# Patient Record
Sex: FEMALE | Race: OTHER | HISPANIC OR LATINO | URBAN - METROPOLITAN AREA
[De-identification: names, ages, dates, MRNs, and addresses within clinical notes are randomized per-mention and may not be internally consistent; named-entity substitution may affect disease eponyms.]

---

## 2018-03-08 ENCOUNTER — EMERGENCY (EMERGENCY)
Facility: HOSPITAL | Age: 58
LOS: 1 days | Discharge: HOME | End: 2018-03-08
Attending: EMERGENCY MEDICINE | Admitting: EMERGENCY MEDICINE
Payer: COMMERCIAL

## 2018-03-08 VITALS
WEIGHT: 160.06 LBS | DIASTOLIC BLOOD PRESSURE: 86 MMHG | TEMPERATURE: 98 F | SYSTOLIC BLOOD PRESSURE: 148 MMHG | HEART RATE: 89 BPM | RESPIRATION RATE: 18 BRPM | OXYGEN SATURATION: 97 %

## 2018-03-08 PROBLEM — Z00.00 ENCOUNTER FOR PREVENTIVE HEALTH EXAMINATION: Status: ACTIVE | Noted: 2018-03-08

## 2018-03-08 LAB
APPEARANCE UR: CLEAR — SIGNIFICANT CHANGE UP
BACTERIA # UR AUTO: ABNORMAL /HPF
BILIRUB UR-MCNC: NEGATIVE — SIGNIFICANT CHANGE UP
COLOR SPEC: YELLOW — SIGNIFICANT CHANGE UP
DIFF PNL FLD: NEGATIVE — SIGNIFICANT CHANGE UP
EPI CELLS # UR: SIGNIFICANT CHANGE UP /HPF
GLUCOSE UR QL: NEGATIVE — SIGNIFICANT CHANGE UP
HYALINE CASTS # UR AUTO: ABNORMAL
KETONES UR-MCNC: NEGATIVE — SIGNIFICANT CHANGE UP
LEUKOCYTE ESTERASE UR-ACNC: ABNORMAL
NITRITE UR-MCNC: NEGATIVE — SIGNIFICANT CHANGE UP
PH UR: 7.5 — SIGNIFICANT CHANGE UP (ref 5–8)
PROT UR-MCNC: SIGNIFICANT CHANGE UP
RBC CASTS # UR COMP ASSIST: SIGNIFICANT CHANGE UP /HPF (ref 0–2)
SP GR SPEC: 1.01 — SIGNIFICANT CHANGE UP (ref 1.01–1.02)
UROBILINOGEN FLD QL: NEGATIVE — SIGNIFICANT CHANGE UP
WBC UR QL: SIGNIFICANT CHANGE UP /HPF (ref 0–5)

## 2018-03-08 PROCEDURE — 99283 EMERGENCY DEPT VISIT LOW MDM: CPT

## 2018-03-08 PROCEDURE — 81001 URINALYSIS AUTO W/SCOPE: CPT

## 2018-03-08 RX ORDER — DIAZEPAM 5 MG
5 TABLET ORAL ONCE
Qty: 0 | Refills: 0 | Status: DISCONTINUED | OUTPATIENT
Start: 2018-03-08 | End: 2018-03-08

## 2018-03-08 RX ORDER — IBUPROFEN 200 MG
1 TABLET ORAL
Qty: 20 | Refills: 0 | OUTPATIENT
Start: 2018-03-08 | End: 2018-03-12

## 2018-03-08 RX ORDER — DIAZEPAM 5 MG
1 TABLET ORAL
Qty: 9 | Refills: 0 | OUTPATIENT
Start: 2018-03-08 | End: 2018-03-10

## 2018-03-08 RX ORDER — IBUPROFEN 200 MG
600 TABLET ORAL ONCE
Qty: 0 | Refills: 0 | Status: COMPLETED | OUTPATIENT
Start: 2018-03-08 | End: 2018-03-08

## 2018-03-08 RX ADMIN — Medication 5 MILLIGRAM(S): at 09:25

## 2018-03-08 RX ADMIN — Medication 600 MILLIGRAM(S): at 09:25

## 2018-03-08 NOTE — ED PROVIDER NOTE - MEDICAL DECISION MAKING DETAILS
doug schmitt slip and fall on ice fall back barber no rib ttp , no abd ttp , paraverterbral l spine ttp stregth 5/5 le bl no saddle anesthesia - ck ua for hematuria - occult renla injury - nsaids and benzo for analgesia and reeaval

## 2018-03-08 NOTE — ED ADULT NURSE NOTE - OBJECTIVE STATEMENT
Recvd pt awake, alert and responsive to all stimuli.  no sob or respiratory distress noted.  pt is s/p slip and fall from black ice.  pt c/o right hip pain.  pt denies any LOC or hitting head.  no other complaints appreciated.  pt resting in bed post prescribed meds.  will continue to monitor.

## 2018-03-08 NOTE — ED PROVIDER NOTE - PHYSICAL EXAMINATION
right lateral lumbar region with tenderness to palpation over paraspinal mucles.   no c, t, l spine tenderness.   patient able to ambulate without help.   full range of motion of lower extremiteies.   no other injuries.   head is atraumatic, normocephalic.

## 2018-03-08 NOTE — ED PROVIDER NOTE - PLAN OF CARE
1) Follow up with your doctor in 1 week. Call today for an appointment.   2) Return to the ER immediately for new or worsening symptoms including severe uncontrolled pain or blood in the urine.   3) Take Motrin 600mg every 6 hours as needed for mild to moderate pain.   4) TAke Valium 5mg every 8 hours as needed for severe pain. This medicine may make you nauseas, or dizzy. Do not drink alcohol and do not drive while taking this medicine.

## 2018-03-08 NOTE — ED PROVIDER NOTE - CARE PLAN
Principal Discharge DX:	Acute right-sided low back pain without sciatica Principal Discharge DX:	Acute right-sided low back pain without sciatica  Assessment and plan of treatment:	1) Follow up with your doctor in 1 week. Call today for an appointment.   2) Return to the ER immediately for new or worsening symptoms including severe uncontrolled pain or blood in the urine.   3) Take Motrin 600mg every 6 hours as needed for mild to moderate pain.   4) TAke Valium 5mg every 8 hours as needed for severe pain. This medicine may make you nauseas, or dizzy. Do not drink alcohol and do not drive while taking this medicine.

## 2018-03-08 NOTE — ED PROVIDER NOTE - OBJECTIVE STATEMENT
58yoF hx of hld, pw fall, slip on ice, down 3 stairs landing on mid back, able to get up but endorsing 8/10 pain in rigtht side and right upper hip. sharp, non radiating, constant. + hit head, + loc for 2-3 seconds. denies other injuries, headaches, chest pain, abd, sob, weakness, numbness, tingling, fevers, chills, nausea, vomiting, diarrhea or other issues.

## 2018-03-08 NOTE — ED PROVIDER NOTE - PROGRESS NOTE DETAILS
PT seen and reassessed.  Patient symptomatically improved.   AAOX3, NAD, VSS.  Discussed test results w/ patient, given copy of results. Patient verbalized understanding of hospital course and outpatient plans, has decisional making capacity.  Will follow-up with Primary care doctor in the next 5-7 days; patient will call for an appointment. Will return to the ED if there is any worsening of symptoms.  Patient able to ambulate w/o difficulty, is tolerating PO intake  Navin LEVIN

## 2022-10-24 NOTE — ED PROVIDER NOTE - CPE EDP GASTRO NORM
normal... Rhofade Counseling: Rhofade is a topical medication which can decrease superficial blood flow where applied. Side effects are uncommon and include stinging, redness and allergic reactions.

## 2022-12-29 NOTE — ED PROVIDER NOTE - CROS ED [facility] ALL NEG
Chief Complaint   Patient presents with   • Follow-up     GERD, MEDICATIONS, PRIOR AUTHORIZATION        Yelena Gaines is a 85 year old female presenting with a follow up for GERD, MEDICATIONS, PRIOR AUTHORIZATION     Denies Known Latex allergy.  Medications verified.  Tobacco Hx verified and updated, if changes.  Allergies verified.     Patient would like communication of their results via:      [employer]    Cell Phone:   Telephone Information:   Mobile [phone]     Okay to leave a message containing results? Yes    Social History     Tobacco Use   Smoking Status Never   Smokeless Tobacco Never       
- - -
